# Patient Record
Sex: MALE | HISPANIC OR LATINO | Employment: FULL TIME | URBAN - METROPOLITAN AREA
[De-identification: names, ages, dates, MRNs, and addresses within clinical notes are randomized per-mention and may not be internally consistent; named-entity substitution may affect disease eponyms.]

---

## 2020-02-17 ENCOUNTER — APPOINTMENT (OUTPATIENT)
Dept: RADIOLOGY | Facility: MEDICAL CENTER | Age: 55
End: 2020-02-17
Attending: EMERGENCY MEDICINE
Payer: COMMERCIAL

## 2020-02-17 ENCOUNTER — HOSPITAL ENCOUNTER (EMERGENCY)
Facility: MEDICAL CENTER | Age: 55
End: 2020-02-17
Attending: EMERGENCY MEDICINE
Payer: COMMERCIAL

## 2020-02-17 VITALS
HEIGHT: 67 IN | TEMPERATURE: 99.5 F | OXYGEN SATURATION: 98 % | BODY MASS INDEX: 27.13 KG/M2 | SYSTOLIC BLOOD PRESSURE: 148 MMHG | RESPIRATION RATE: 16 BRPM | WEIGHT: 172.84 LBS | DIASTOLIC BLOOD PRESSURE: 86 MMHG | HEART RATE: 68 BPM

## 2020-02-17 DIAGNOSIS — K29.00 ACUTE GASTRITIS WITHOUT HEMORRHAGE, UNSPECIFIED GASTRITIS TYPE: ICD-10-CM

## 2020-02-17 DIAGNOSIS — R10.12 ABDOMINAL PAIN, ACUTE, LEFT UPPER QUADRANT: ICD-10-CM

## 2020-02-17 LAB
ALBUMIN SERPL BCP-MCNC: 4.7 G/DL (ref 3.2–4.9)
ALBUMIN/GLOB SERPL: 1.3 G/DL
ALP SERPL-CCNC: 77 U/L (ref 30–99)
ALT SERPL-CCNC: 30 U/L (ref 2–50)
ANION GAP SERPL CALC-SCNC: 9 MMOL/L (ref 0–11.9)
APPEARANCE UR: CLEAR
AST SERPL-CCNC: 23 U/L (ref 12–45)
BASOPHILS # BLD AUTO: 0.4 % (ref 0–1.8)
BASOPHILS # BLD: 0.03 K/UL (ref 0–0.12)
BILIRUB SERPL-MCNC: 0.8 MG/DL (ref 0.1–1.5)
BILIRUB UR QL STRIP.AUTO: NEGATIVE
BUN SERPL-MCNC: 10 MG/DL (ref 8–22)
CALCIUM SERPL-MCNC: 9.9 MG/DL (ref 8.5–10.5)
CHLORIDE SERPL-SCNC: 104 MMOL/L (ref 96–112)
CO2 SERPL-SCNC: 25 MMOL/L (ref 20–33)
COLOR UR: YELLOW
CREAT SERPL-MCNC: 0.83 MG/DL (ref 0.5–1.4)
EKG IMPRESSION: NORMAL
EOSINOPHIL # BLD AUTO: 0.09 K/UL (ref 0–0.51)
EOSINOPHIL NFR BLD: 1.3 % (ref 0–6.9)
ERYTHROCYTE [DISTWIDTH] IN BLOOD BY AUTOMATED COUNT: 37.5 FL (ref 35.9–50)
GLOBULIN SER CALC-MCNC: 3.7 G/DL (ref 1.9–3.5)
GLUCOSE SERPL-MCNC: 104 MG/DL (ref 65–99)
GLUCOSE UR STRIP.AUTO-MCNC: NEGATIVE MG/DL
HCT VFR BLD AUTO: 45.3 % (ref 42–52)
HGB BLD-MCNC: 16.2 G/DL (ref 14–18)
IMM GRANULOCYTES # BLD AUTO: 0.01 K/UL (ref 0–0.11)
IMM GRANULOCYTES NFR BLD AUTO: 0.1 % (ref 0–0.9)
KETONES UR STRIP.AUTO-MCNC: NEGATIVE MG/DL
LEUKOCYTE ESTERASE UR QL STRIP.AUTO: NEGATIVE
LIPASE SERPL-CCNC: 20 U/L (ref 11–82)
LYMPHOCYTES # BLD AUTO: 1.81 K/UL (ref 1–4.8)
LYMPHOCYTES NFR BLD: 27.1 % (ref 22–41)
MCH RBC QN AUTO: 30.7 PG (ref 27–33)
MCHC RBC AUTO-ENTMCNC: 35.8 G/DL (ref 33.7–35.3)
MCV RBC AUTO: 85.8 FL (ref 81.4–97.8)
MICRO URNS: NORMAL
MONOCYTES # BLD AUTO: 0.67 K/UL (ref 0–0.85)
MONOCYTES NFR BLD AUTO: 10 % (ref 0–13.4)
NEUTROPHILS # BLD AUTO: 4.06 K/UL (ref 1.82–7.42)
NEUTROPHILS NFR BLD: 61.1 % (ref 44–72)
NITRITE UR QL STRIP.AUTO: NEGATIVE
NRBC # BLD AUTO: 0 K/UL
NRBC BLD-RTO: 0 /100 WBC
PH UR STRIP.AUTO: 7 [PH] (ref 5–8)
PLATELET # BLD AUTO: 176 K/UL (ref 164–446)
PMV BLD AUTO: 9.5 FL (ref 9–12.9)
POTASSIUM SERPL-SCNC: 3.7 MMOL/L (ref 3.6–5.5)
PROT SERPL-MCNC: 8.4 G/DL (ref 6–8.2)
PROT UR QL STRIP: NEGATIVE MG/DL
RBC # BLD AUTO: 5.28 M/UL (ref 4.7–6.1)
RBC UR QL AUTO: NEGATIVE
SODIUM SERPL-SCNC: 138 MMOL/L (ref 135–145)
SP GR UR STRIP.AUTO: 1.01
TROPONIN T SERPL-MCNC: 8 NG/L (ref 6–19)
UROBILINOGEN UR STRIP.AUTO-MCNC: 0.2 MG/DL
WBC # BLD AUTO: 6.7 K/UL (ref 4.8–10.8)

## 2020-02-17 PROCEDURE — A9270 NON-COVERED ITEM OR SERVICE: HCPCS | Performed by: EMERGENCY MEDICINE

## 2020-02-17 PROCEDURE — 74177 CT ABD & PELVIS W/CONTRAST: CPT

## 2020-02-17 PROCEDURE — 84484 ASSAY OF TROPONIN QUANT: CPT

## 2020-02-17 PROCEDURE — 83690 ASSAY OF LIPASE: CPT

## 2020-02-17 PROCEDURE — 81003 URINALYSIS AUTO W/O SCOPE: CPT

## 2020-02-17 PROCEDURE — 700117 HCHG RX CONTRAST REV CODE 255: Performed by: EMERGENCY MEDICINE

## 2020-02-17 PROCEDURE — 700102 HCHG RX REV CODE 250 W/ 637 OVERRIDE(OP): Performed by: EMERGENCY MEDICINE

## 2020-02-17 PROCEDURE — 93005 ELECTROCARDIOGRAM TRACING: CPT | Performed by: EMERGENCY MEDICINE

## 2020-02-17 PROCEDURE — 36415 COLL VENOUS BLD VENIPUNCTURE: CPT

## 2020-02-17 PROCEDURE — 99285 EMERGENCY DEPT VISIT HI MDM: CPT

## 2020-02-17 PROCEDURE — 85025 COMPLETE CBC W/AUTO DIFF WBC: CPT

## 2020-02-17 PROCEDURE — 80053 COMPREHEN METABOLIC PANEL: CPT

## 2020-02-17 RX ORDER — PANTOPRAZOLE SODIUM 40 MG/1
40 TABLET, DELAYED RELEASE ORAL DAILY
Qty: 30 TAB | Refills: 0 | Status: SHIPPED | OUTPATIENT
Start: 2020-02-17 | End: 2021-08-11

## 2020-02-17 RX ADMIN — LIDOCAINE HYDROCHLORIDE 30 ML: 20 SOLUTION OROPHARYNGEAL at 20:44

## 2020-02-17 RX ADMIN — IOHEXOL 100 ML: 350 INJECTION, SOLUTION INTRAVENOUS at 20:17

## 2020-02-18 NOTE — ED NOTES
All lines and monitors discontinued. Discharge instructions given, questions answered.    ambulatory out of ER, escorted by family.  Instructed not to drive after taking pain medication and pt verbalizes understanding.  Rx x protonix given.

## 2020-02-18 NOTE — ED TRIAGE NOTES
Pt arrived with wife via pov.     Chief Complaint   Patient presents with   • Abdominal Pain     pain started at upper abdomin and radiating up to throat. on and off for several days. taking prioloec with no relief, better with urination and stool.      Pt oriented to ed process. Discussed letting staff know of any changes. Protocol ordered.

## 2020-02-18 NOTE — ED PROVIDER NOTES
"ED Provider Note    Scribed for Elo Cordoba M.D. by Essence Burgos. 2/17/2020, 7:22 PM.    Primary care provider: Pcp Unobtainable By   Means of arrival: walk in   History obtained from: patient   History limited by: none     CHIEF COMPLAINT  Chief Complaint   Patient presents with   • Abdominal Pain     pain started at upper abdomin and radiating up to throat. on and off for several days. taking prioloec with no relief, better with urination and stool.        BRIONNA Martínez is a 54 y.o. male who presents to the Emergency Department for evaluation of worsening left upper quadrant abdominal pain onset  3 weeks ago. He reports associated chills, sweating, vomiting, chest pain, and diarrhea. He notes that his abdominal pain feels like a \"burning\" sensation that radiates from his left upper quadrant and radiates to his back, throat, and left scapula. Patient reports that his last episode of vomiting was 4 days ago and that it was green and acidic in quality. He has had consistent soft stools and diarrhea for the last 3 weeks. He reports he last felt feverish this morning.     He notes that he has taken Prilosec with minimal alleviation. He states that urination and defecation alleviates his pain. He notes that his pain is exacerbated shortly after eating and when he is laying down. He adds that he does not take daily over the counter medications. He notes that he has traveled to [place] 3 months ago and states that he has had similar symptoms since he returned that have been worsening. He states he has lost approximately 3 pounds within the last 3 weeks in addition to about 5 pounds over the last 3 months. He notes he is a social drinker, will have 6-7 drinks at a time, last time at this point he was about a month ago, did not notice an exacerbation of the pain after that.      He denies being placed on any antibiotics recently. Patient denies any contact with sick individuals. Patient denies " "any surgeries Patient denies any shortness of breath, dysuria, blood in diarrhea or melena.     REVIEW OF SYSTEMS  Pertinent positives include abdominal pain, chills, sweating, vomiting, chest pain, and diarrhea. Pertinent negatives include no shortness of breath, dysuria, blood present in diarrhea or melena. All other systems reviewed and negative.     PAST MEDICAL HISTORY  None.     SURGICAL HISTORY  None.     SOCIAL HISTORY  Social History     Tobacco Use   • Smoking status: Never Smoker   • Smokeless tobacco: Never Used   Substance Use Topics   • Alcohol use: Yes     Comment: 3-4 beers / week    • Drug use: Never      Social History     Substance and Sexual Activity   Drug Use Never       FAMILY HISTORY  Family History   Problem Relation Age of Onset   • Hypertension Mother        CURRENT MEDICATIONS  No current facility-administered medications for this encounter.     Current Outpatient Medications:   •  omeprazole (PRILOSEC) 20 MG CPDR, Take 20 mg by mouth every day., Disp: , Rfl:   •  Acetaminophen (TYLENOL 8 HOUR PO), Take  by mouth., Disp: , Rfl:      ALLERGIES  Not on File    PHYSICAL EXAM  VITAL SIGNS: /99   Pulse 69   Temp 37.5 °C (99.5 °F) (Temporal)   Resp 16   Ht 1.702 m (5' 7\")   Wt 78.4 kg (172 lb 13.5 oz)   SpO2 97%   BMI 27.07 kg/m²     Constitutional: Well developed, No acute distress, appears mildly uncomfortable and tired, nondiaphoretic.   HENT: Normocephalic, Atraumatic, Bilateral external ears normal, Oropharynx moist, No oral exudates, Nose normal.   Eyes: PERRL, EOMI, Conjunctiva normal  Neck: Normal range of motion, No tenderness, Supple  Cardiovascular: Normal heart rate, Normal rhythm  Thorax & Lungs: Normal breath sounds, No respiratory distress, No wheezing, No chest tenderness.   Abdomen: Diffuse tenderness, most in left upper quadrant, no rebounding, negative Escobar sign,   Skin: Warm, Dry, No erythema, No rash, no jaundice  Back: No tenderness, No CVA tenderness. "   Extremities: Intact distal pulses, No edema, No tenderness   Neurologic: Alert & oriented x 3, Normal motor function, Normal sensory function, No focal deficits noted.  Psychiatric: Appropriate                                                     DIAGNOSTIC STUDIES / PROCEDURES\    LABS  Results for orders placed or performed during the hospital encounter of 02/17/20   CBC WITH DIFFERENTIAL   Result Value Ref Range    WBC 6.7 4.8 - 10.8 K/uL    RBC 5.28 4.70 - 6.10 M/uL    Hemoglobin 16.2 14.0 - 18.0 g/dL    Hematocrit 45.3 42.0 - 52.0 %    MCV 85.8 81.4 - 97.8 fL    MCH 30.7 27.0 - 33.0 pg    MCHC 35.8 (H) 33.7 - 35.3 g/dL    RDW 37.5 35.9 - 50.0 fL    Platelet Count 176 164 - 446 K/uL    MPV 9.5 9.0 - 12.9 fL    Neutrophils-Polys 61.10 44.00 - 72.00 %    Lymphocytes 27.10 22.00 - 41.00 %    Monocytes 10.00 0.00 - 13.40 %    Eosinophils 1.30 0.00 - 6.90 %    Basophils 0.40 0.00 - 1.80 %    Immature Granulocytes 0.10 0.00 - 0.90 %    Nucleated RBC 0.00 /100 WBC    Neutrophils (Absolute) 4.06 1.82 - 7.42 K/uL    Lymphs (Absolute) 1.81 1.00 - 4.80 K/uL    Monos (Absolute) 0.67 0.00 - 0.85 K/uL    Eos (Absolute) 0.09 0.00 - 0.51 K/uL    Baso (Absolute) 0.03 0.00 - 0.12 K/uL    Immature Granulocytes (abs) 0.01 0.00 - 0.11 K/uL    NRBC (Absolute) 0.00 K/uL   COMP METABOLIC PANEL   Result Value Ref Range    Sodium 138 135 - 145 mmol/L    Potassium 3.7 3.6 - 5.5 mmol/L    Chloride 104 96 - 112 mmol/L    Co2 25 20 - 33 mmol/L    Anion Gap 9.0 0.0 - 11.9    Glucose 104 (H) 65 - 99 mg/dL    Bun 10 8 - 22 mg/dL    Creatinine 0.83 0.50 - 1.40 mg/dL    Calcium 9.9 8.5 - 10.5 mg/dL    AST(SGOT) 23 12 - 45 U/L    ALT(SGPT) 30 2 - 50 U/L    Alkaline Phosphatase 77 30 - 99 U/L    Total Bilirubin 0.8 0.1 - 1.5 mg/dL    Albumin 4.7 3.2 - 4.9 g/dL    Total Protein 8.4 (H) 6.0 - 8.2 g/dL    Globulin 3.7 (H) 1.9 - 3.5 g/dL    A-G Ratio 1.3 g/dL   LIPASE   Result Value Ref Range    Lipase 20 11 - 82 U/L   URINALYSIS,CULTURE IF INDICATED    Result Value Ref Range    Color Yellow     Character Clear     Specific Gravity 1.007 <1.035    Ph 7.0 5.0 - 8.0    Glucose Negative Negative mg/dL    Ketones Negative Negative mg/dL    Protein Negative Negative mg/dL    Bilirubin Negative Negative    Urobilinogen, Urine 0.2 Negative    Nitrite Negative Negative    Leukocyte Esterase Negative Negative    Occult Blood Negative Negative    Micro Urine Req see below    ESTIMATED GFR   Result Value Ref Range    GFR If African American >60 >60 mL/min/1.73 m 2    GFR If Non African American >60 >60 mL/min/1.73 m 2   TROPONIN   Result Value Ref Range    Troponin T 8 6 - 19 ng/L   EKG   Result Value Ref Range    Report       [facility] Emergency Dept.    Test Date:  2020  Pt Name:    AYSE CHIN          Department: ER  MRN:        [id number]                      Room:       [facility]  Gender:     Male                         Technician: [id number]  :        [id number]                   Requested By:ELO DUNNE  Order #:    [id number]                    Reading MD: Elo Osman    Measurements  Intervals                                Axis  Rate:       77                           P:          32  DE:         140                          QRS:        56  QRSD:       100                          T:          42  QT:         380  QTc:        431    Interpretive Statements  SINUS RHYTHM  RSR' IN V1 OR V2, RIGHT VCD OR RVH  BASELINE WANDER IN LEAD(S) V5  No previous ECG available for comparison  Electronically Signed On 2020 21:23:30 PST by Elo Osman        All labs reviewed by me.    RADIOLOGY  CT-ABDOMEN-PELVIS WITH   Final Result         1.  No acute abnormality.   2.  Right lower pole nephrolithiasis without visualized obstructive changes.   3.  Right renal cyst without visualized complex features.   4.  Hepatomegaly and diffuse hepatic steatosis        The radiologist's interpretation of all radiological studies have been reviewed  by me.    COURSE & MEDICAL DECISION MAKING  Nursing notes, JUANCARLOS PMSFHx reviewed in chart.     7:22 PM Patient seen and examined at bedside. The patient presents with left upper quadrant and epigastric abdominal pain, accompanied by heartburn and diarrhea; differential diagnosis includes but is not limited to peptic ulcer disease/GERD, H. pylori, gastritis, pancreatitis, hepatitis. Ordered for Estimated GFR, CBC with diff, CMP, Lipase, CT abdomen pelvis, troponin and Urinalysis to evaluate. Patient was treated with GI cocktail oral susp 30 mL for his symptoms. I informed the patient the need for labs and radiology to rule out any emergent processes. Currently awaiting labs and radiology results before deciding if intervention is necessary.  Patient verbalizes understanding and agreement to this plan of care.     EKG shows no evidence of acute MI.  Troponin was negative.  CT of the abdomen pelvis was essentially unremarkable.  Patient was advised to follow-up with his regular doctor concerning the renal cyst.  Patient did have relief with a GI cocktail.  I have advised the patient to follow-up with his GI doctor as he may have H. pylori.  I suspect he likely has gastritis causing his pain.  I do not suspect a GI bleed at this time.  Laboratory results show normal white count without evidence of bandemia.  There is no evidence of anemia.  Patient has a normal BUN and creatinine.  There is no evidence of significant electrolyte abnormalities.  There is no evidence of pancreatitis or hepatitis.  I believe the patient is stable for outpatient management.  He understands he is to follow-up with his GI doctor as he may need endoscopy.  He is currently taking Prilosec which is not helping with this pain.  Therefore I will try Protonix.  Strict return precautions were given and patient and family understand the plan.    FINAL IMPRESSION  1. Abdominal pain, acute, left upper quadrant    2. Acute gastritis without hemorrhage,  unspecified gastritis type          I, Essence Burgos ([employer]), am scribing for, and in the presence of, Elo Cordoba M.D..    Electronically signed by: Essence Burgos ([employer]), 2/17/2020    I, Elo Cordoba M.D. personally performed the services described in this documentation, as scribed by Essence Burgos in my presence, and it is both accurate and complete. C    The note accurately reflects work and decisions made by me.  Elo Cordoba M.D.  2/17/2020  10:11 PM

## 2020-02-18 NOTE — DISCHARGE INSTRUCTIONS
Stop taking the Prilosec and try the new medication.  Call your GI doctor in the morning to schedule follow-up appointment.  You may need to be tested for H. pylori.  If you develop any worsening pain, a fever, vomit blood or any other concerns return.  Follow-up with your regular doctor concerning your renal cyst.

## 2021-08-11 PROBLEM — M24.521: Status: ACTIVE | Noted: 2021-08-11

## 2021-08-11 PROBLEM — M19.121 POST-TRAUMATIC OSTEOARTHRITIS OF RIGHT ELBOW: Status: ACTIVE | Noted: 2021-08-11

## 2021-08-11 PROBLEM — M19.021 OSTEOARTHRITIS OF RIGHT ELBOW: Status: ACTIVE | Noted: 2021-08-11
